# Patient Record
Sex: FEMALE | Race: WHITE | NOT HISPANIC OR LATINO | Employment: STUDENT
[De-identification: names, ages, dates, MRNs, and addresses within clinical notes are randomized per-mention and may not be internally consistent; named-entity substitution may affect disease eponyms.]

---
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
## 2024-01-22 ENCOUNTER — MYC REFILL (OUTPATIENT)
Dept: FAMILY MEDICINE | Facility: CLINIC | Age: 19
End: 2024-01-22
Payer: COMMERCIAL

## 2024-01-22 DIAGNOSIS — N92.6 IRREGULAR MENSES: ICD-10-CM

## 2024-01-22 DIAGNOSIS — Z30.011 ENCOUNTER FOR INITIAL PRESCRIPTION OF CONTRACEPTIVE PILLS: ICD-10-CM

## 2024-01-22 RX ORDER — DESOGESTREL AND ETHINYL ESTRADIOL 0.15-0.03
1 KIT ORAL DAILY
Qty: 84 TABLET | Refills: 0 | Status: SHIPPED | OUTPATIENT
Start: 2024-01-22 | End: 2024-04-21

## 2024-04-21 ENCOUNTER — MYC REFILL (OUTPATIENT)
Dept: FAMILY MEDICINE | Facility: CLINIC | Age: 19
End: 2024-04-21
Payer: COMMERCIAL

## 2024-04-21 DIAGNOSIS — Z30.011 ENCOUNTER FOR INITIAL PRESCRIPTION OF CONTRACEPTIVE PILLS: ICD-10-CM

## 2024-04-21 DIAGNOSIS — N92.6 IRREGULAR MENSES: ICD-10-CM

## 2024-04-22 RX ORDER — DESOGESTREL AND ETHINYL ESTRADIOL 0.15-0.03
1 KIT ORAL DAILY
Qty: 84 TABLET | Refills: 0 | Status: SHIPPED | OUTPATIENT
Start: 2024-04-22 | End: 2024-05-03

## 2024-05-02 NOTE — PROGRESS NOTES
Assessment/Plan:   Olga Lidia is a 19 year old female here for physical     Routine adult health maintenance  Physical completed today.  Up-to-date with immunizations.  No bloodwork indicated today.  Will start Pap smears at age 20.    Irregular menses  Encounter for initial prescription of contraceptive pills  On OCPs for contraception and regulation of menstrual cycle, doing well, no concerns or side effects, refill provided today.  - desogestrel-ethinyl estradiol (APRI) 0.15-30 MG-MCG tablet  Dispense: 84 tablet; Refill: 3       I have had an Advance Directives discussion with the patient.    Follow up: 1 year for physical, sooner as needed    Shelia Matamoros MD  [facility]      Subjective:     Olga Lidia Hernandez is a 19 year old female who presents for an annual exam.     Question 5/3/2024 12:09 PM CDT - Filed by Patient   In general, how would you rate your overall physical health? Excellent   Do you get at least 3 servings of foods that have calcium each day (dairy, green leafy vegetables, etc)? Yes   Do you have a special diet? Regular (no restrictions)   How many servings of fruits and vegetables do you eat daily? (!) 2-3   On average, how many sweetened beverages do you drink each day (Examples: soda, juice, sweet tea, etc.)? Do NOT count diet or artificially sweetened beverages. (!) 2   On average, how many days per week do you engage in moderate to strenuous exercise (like a brisk walk)? 2 days   On average, how many minutes do you engage in exercise at this level? 60 min   How often do you get together with friends or relatives? More than three times a week   Do you see a dentist two times every year? Yes   Do you feel stress - tense, restless, nervous, or anxious, or unable to sleep at night because your mind is troubled all the time - these days? Not at all   If you drink alcohol, do you typically have more than 3 drinks per day OR more than 7 drinks per week? No   Do you use any substances not  prescribed by a provider, out of habit or for other reasons? No   Have you ever been tested for HIV (at a blood donation center, clinic, or during routine prenatal care)? No   Have you had any new sexual partners since you were last tested for sexually transmitted infections, including HIV? No   Do you have any questions about contraception (birth control) or family planning? No   Within the past 12 months, did you worry that your food would run out before you got money to buy more? No   Within the past 12 months, did the food you bought just not last and you didn t have money to get more? No   Do you have housing? Yes   Are you worried about losing your housing? No   Within the past 12 months, has lack of transportation kept you from medical appointments, getting your medicines, non-medical meetings or appointments, work, or from getting things that you need? No   Within the past 12 months, have you or your family members you live with been unable to get utilities (heat, electricity) when it was really needed? No   Were you born outside of the [place]? No     Question 5/3/2024 12:09 PM CDT - Filed by Patient   Q1: Little interest or pleasure in doing things Not at all   Q2: Feeling down, depressed or hopeless Not at all   PHQ-2 Score (range: 0 - 6) 0     Nursing program at [facility]    Seeing dermatology    Health Maintenance reviewed:  Lipid Profile: no  Glucose Screen: no  Colonoscopy: no  Mammogram: no    Gynecologic History  Patient's last menstrual period was 04/29/2024.  Contraception: oral contraceptive  Will do pap at age 20    Immunization History   Administered Date(s) Administered    COVID-19 Bivalent 18+ (Moderna) 02/08/2023    COVID-19 MONOVALENT 12+ (Pfizer) 05/03/2021, 07/30/2021    COVID-19 Monovalent 12+ (Pfizer 2022) 08/01/2022    DTAP (<7y) 2004, 2004, 07/27/2005, 07/09/2009    DTaP/HepB/IPV 2004    Flu, Unspecified 2004    HIB(PRP-OMP)(PedvaxHIB) 2004, 2004,  07/27/2005    HPV9 09/23/2015, 11/05/2015    HepA, Unspecified 11/06/2007, 11/11/2008    HepB, Unspecified 2004, 2004    Influenza (H1N1) 11/10/2009    Influenza (IIV3) PF 11/06/2007, 11/11/2008, 11/07/2009    Influenza Vaccine >6 months,quad, PF 11/11/2013, 11/05/2015, 10/24/2020, 09/22/2022, 10/04/2023    Influenza Vaccine, 6+MO IM (QUADRIVALENT W/PRESERVATIVES) 11/01/2010, 10/17/2011, 10/13/2012, 11/05/2014, 10/27/2017, 03/29/2019    MMR 04/27/2005, 07/09/2009    Meningococcal ACWY (Menactra ) 09/23/2015    Pneumococcal (PCV 7) 2004, 2004, 2004, 07/27/2005    Poliovirus, inactivated (IPV) 2004, 2004, 2004, 11/11/2008    TDAP (Adacel,Boostrix) 09/23/2015    Varicella 04/27/2005, 07/09/2009     Immunization status: up to date and documented.    Current Outpatient Medications   Medication Sig Dispense Refill    clindamycin (CLINDAMAX) 1 % external gel APPLY TOPICALLY TO FACE ONCE DAILY      desogestrel-ethinyl estradiol (APRI) 0.15-30 MG-MCG tablet Take 1 tablet by mouth daily 84 tablet 3    omeprazole (PRILOSEC) 20 MG capsule [OMEPRAZOLE (PRILOSEC) 20 MG CAPSULE]       spironolactone (ALDACTONE) 50 MG tablet Take  mg by mouth daily      tretinoin (RETIN-A) 0.025 % external cream APPLY TOPICALLY TO THE AFFECTED AREA(S) AT BEDTIME AS DIRECTED       Past Medical History:   Diagnosis Date    Candidiasis of vulva and vagina     Created by Conversion     Esophageal reflux     Created by Conversion     Sprain of ankle     Created by Conversion  Replacement Utility updated for latest IMO load     History reviewed. No pertinent surgical history.  Amoxicillin  Family History   Problem Relation Age of Onset    No Known Problems Mother     No Known Problems Father     No Known Problems Sister     No Known Problems Brother     Cancer Maternal Grandmother     Diabetes Paternal Grandmother     Cancer Paternal Grandmother      Social History     Socioeconomic History    Marital  status: Single     Spouse name: Not on file    Number of children: Not on file    Years of education: Not on file    Highest education level: Not on file   Occupational History    Not on file   Tobacco Use    Smoking status: Never     Passive exposure: Never    Smokeless tobacco: Never    Tobacco comments:     no second smoke   Substance and Sexual Activity    Alcohol use: Never    Drug use: Not on file    Sexual activity: Never   Other Topics Concern    Not on file   Social History Narrative    Not on file     Social Determinants of Health     Financial Resource Strain: Low Risk  (5/3/2024)    Financial Resource Strain     Within the past 12 months, have you or your family members you live with been unable to get utilities (heat, electricity) when it was really needed?: No   Food Insecurity: Low Risk  (5/3/2024)    Food Insecurity     Within the past 12 months, did you worry that your food would run out before you got money to buy more?: No     Within the past 12 months, did the food you bought just not last and you didn t have money to get more?: No   Transportation Needs: Low Risk  (5/3/2024)    Transportation Needs     Within the past 12 months, has lack of transportation kept you from medical appointments, getting your medicines, non-medical meetings or appointments, work, or from getting things that you need?: No   Physical Activity: Insufficiently Active (5/3/2024)    Exercise Vital Sign     Days of Exercise per Week: 2 days     Minutes of Exercise per Session: 60 min   Stress: No Stress Concern Present (5/3/2024)    [employer] - Occupational Stress Questionnaire     Feeling of Stress : Not at all   Social Connections: Unknown (5/3/2024)    Social Connection and Isolation Panel [NHANES]     Frequency of Communication with Friends and Family: Not on file     Frequency of Social Gatherings with Friends and Family: More than three times a week     Attends Jain Services: Not on  "file     Active Member of Clubs or Organizations: Not on file     Attends Club or Organization Meetings: Not on file     Marital Status: Not on file   Interpersonal Safety: Low Risk  (5/3/2024)    Interpersonal Safety     Do you feel physically and emotionally safe where you currently live?: Yes     Within the past 12 months, have you been hit, slapped, kicked or otherwise physically hurt by someone?: No     Within the past 12 months, have you been humiliated or emotionally abused in other ways by your partner or ex-partner?: No   Housing Stability: Low Risk  (5/3/2024)    Housing Stability     Do you have housing? : Yes     Are you worried about losing your housing?: No       Review of Systems negative unless noted    Objective:        Vitals:    05/03/24 1347   BP: 114/62   Pulse: 99   Resp: 14   Temp: 98.3  F (36.8  C)   TempSrc: Temporal   SpO2: 99%   Weight: 50.2 kg (110 lb 9.6 oz)   Height: 1.69 m (5' 6.54\")   PainSc: No Pain (0)     Body mass index is 17.57 kg/m .    Physical Exam:  General Appearance: Alert, pleasant, appears stated age  Head: Normocephalic, without obvious abnormality  Eyes: PERRL, conjunctiva/corneas clear, EOM's intact  Ears: Normal TM's and external ear canals, both ears  Nose: Nares normal, septum midline,mucosa normal, no drainage  Throat: Lips, mucosa, and tongue normal; teeth and gums normal; oropharynx is clear  Neck: Supple,without lymphadenopathy, no thyromegaly or nodules noted  Lungs: Clear to auscultation bilaterally, respirations unlabored, no wheezing or crackles  Heart: Regular rate and rhythm, no murmur   Abdomen: Soft, non-tender, no masses, no organomegaly  Extremities: Extremities with strong and symmetric pulses, no cyanosis or edema  Skin: Skin color, texture normal, no rashes or lesions  Neurologic: Grossly normal, no focal deficits    "

## 2024-05-03 ENCOUNTER — OFFICE VISIT (OUTPATIENT)
Dept: FAMILY MEDICINE | Facility: CLINIC | Age: 19
End: 2024-05-03
Payer: COMMERCIAL

## 2024-05-03 VITALS
SYSTOLIC BLOOD PRESSURE: 114 MMHG | OXYGEN SATURATION: 99 % | TEMPERATURE: 98.3 F | HEIGHT: 67 IN | WEIGHT: 110.6 LBS | DIASTOLIC BLOOD PRESSURE: 62 MMHG | HEART RATE: 99 BPM | BODY MASS INDEX: 17.36 KG/M2 | RESPIRATION RATE: 14 BRPM

## 2024-05-03 DIAGNOSIS — Z00.00 ROUTINE ADULT HEALTH MAINTENANCE: Primary | ICD-10-CM

## 2024-05-03 DIAGNOSIS — Z30.011 ENCOUNTER FOR INITIAL PRESCRIPTION OF CONTRACEPTIVE PILLS: ICD-10-CM

## 2024-05-03 DIAGNOSIS — N92.6 IRREGULAR MENSES: ICD-10-CM

## 2024-05-03 PROCEDURE — 99395 PREV VISIT EST AGE 18-39: CPT | Performed by: FAMILY MEDICINE

## 2024-05-03 RX ORDER — DESOGESTREL AND ETHINYL ESTRADIOL 0.15-0.03
1 KIT ORAL DAILY
Qty: 84 TABLET | Refills: 3 | Status: SHIPPED | OUTPATIENT
Start: 2024-05-03

## 2024-05-03 RX ORDER — SPIRONOLACTONE 50 MG/1
50-100 TABLET, FILM COATED ORAL DAILY
COMMUNITY
Start: 2023-08-08

## 2024-05-03 SDOH — HEALTH STABILITY: PHYSICAL HEALTH: ON AVERAGE, HOW MANY DAYS PER WEEK DO YOU ENGAGE IN MODERATE TO STRENUOUS EXERCISE (LIKE A BRISK WALK)?: 2 DAYS

## 2024-05-03 SDOH — HEALTH STABILITY: PHYSICAL HEALTH: ON AVERAGE, HOW MANY MINUTES DO YOU ENGAGE IN EXERCISE AT THIS LEVEL?: 60 MIN

## 2024-05-03 ASSESSMENT — SOCIAL DETERMINANTS OF HEALTH (SDOH): HOW OFTEN DO YOU GET TOGETHER WITH FRIENDS OR RELATIVES?: MORE THAN THREE TIMES A WEEK

## 2024-05-03 ASSESSMENT — PAIN SCALES - GENERAL: PAINLEVEL: NO PAIN (0)

## 2024-05-03 NOTE — PATIENT INSTRUCTIONS
From our records it looks like you need on more HPV vaccine - check to see if you had a 3rd one already otherwise we can do this at a nurse visit

## 2024-06-20 NOTE — PATIENT INSTRUCTIONS
ANATOMY   Your rapid strep test is negative.  Your strep culture should come back tomorrow and we will contact you if this does come back positive and you need antibiotics.  COVID-19 testing should come back tomorrow.    I suspect this is likely a viral upper respiratory illness.  I would recommend Tylenol or ibuprofen, as well as plenty of rest and fluids.  Please be sure to follow-up if your symptoms are getting worse or not improving over the next 3 to 4 days.

## 2025-05-09 ENCOUNTER — RESULTS FOLLOW-UP (OUTPATIENT)
Dept: FAMILY MEDICINE | Facility: CLINIC | Age: 20
End: 2025-05-09

## 2025-06-24 ENCOUNTER — OFFICE VISIT (OUTPATIENT)
Dept: URGENT CARE | Facility: URGENT CARE | Age: 20
End: 2025-06-24
Payer: COMMERCIAL

## 2025-06-24 VITALS
OXYGEN SATURATION: 100 % | WEIGHT: 116 LBS | RESPIRATION RATE: 16 BRPM | SYSTOLIC BLOOD PRESSURE: 129 MMHG | TEMPERATURE: 98.2 F | DIASTOLIC BLOOD PRESSURE: 82 MMHG | BODY MASS INDEX: 18.72 KG/M2 | HEART RATE: 110 BPM

## 2025-06-24 DIAGNOSIS — J01.00 ACUTE NON-RECURRENT MAXILLARY SINUSITIS: Primary | ICD-10-CM

## 2025-06-24 DIAGNOSIS — J02.9 ACUTE PHARYNGITIS, UNSPECIFIED ETIOLOGY: ICD-10-CM

## 2025-06-24 DIAGNOSIS — R07.0 THROAT PAIN: ICD-10-CM

## 2025-06-24 LAB
DEPRECATED S PYO AG THROAT QL EIA: NEGATIVE
S PYO DNA THROAT QL NAA+PROBE: NOT DETECTED

## 2025-06-24 PROCEDURE — 3079F DIAST BP 80-89 MM HG: CPT | Performed by: PHYSICIAN ASSISTANT

## 2025-06-24 PROCEDURE — 87651 STREP A DNA AMP PROBE: CPT | Performed by: PHYSICIAN ASSISTANT

## 2025-06-24 PROCEDURE — 3074F SYST BP LT 130 MM HG: CPT | Performed by: PHYSICIAN ASSISTANT

## 2025-06-24 PROCEDURE — 99213 OFFICE O/P EST LOW 20 MIN: CPT | Performed by: PHYSICIAN ASSISTANT

## 2025-06-24 RX ORDER — AZITHROMYCIN 500 MG/1
500 TABLET, FILM COATED ORAL DAILY
Qty: 5 TABLET | Refills: 0 | Status: SHIPPED | OUTPATIENT
Start: 2025-06-24 | End: 2025-06-29

## 2025-06-24 NOTE — PATIENT INSTRUCTIONS
Suggested increased rest increased fluids and bedside humidification  Over-the-counter Tylenol for comfort.  Follow packaging directions  Over-the-counter throat lozenges with benzocaine, such as Cepacol, may be used if indicated and is not a choking hazard based on age.    Follow packaging directions for throat lozenges.    Do not overuse the benzocaine as it will dry the throat and make it uncomfortable.  Use one lozenge per hour as directed on package and may use hard candies lemon drops etc. keep the saliva flowing until the next dosing interval after 1 hour.    Follow up with primary care provider if you do not get resolution with the course of treatment.  Return to walk-in care if complication or new symptoms arise in the interim.     Over-the-counter nasal steroid spray, follow packaging directions  Over-the-counter Mucinex, follow packaging directions  Hot packs 3 times per day to the forehead and face over the tender sinuses  Nasal saline irrigation or Bethany Hicks for congestion  Antibiotic as written below.  Risks and benefits of the medication were gone over.  Indication for return to see urgent care or family practice provider for reevaluation and treatment.

## 2025-06-24 NOTE — PROGRESS NOTES
[facility] Visit    Chief Complaint   Patient presents with    Sinus Problem      Fever, Sinus, Sore Throat since yesterday

## 2025-06-24 NOTE — PROGRESS NOTES
Patient presents with:  Sinus Problem:  Fever, Sinus, Sore Throat since yesterday       Clinical Decision Making:  Strep test was obtained and was negative.  Culture is to follow.  Symptomatic care was gone over. Expected course of resolution and indication for return was gone over and questions were answered to patient/parent's satisfaction before discharge.        ICD-10-CM    1. Acute non-recurrent maxillary sinusitis  J01.00 azithromycin (ZITHROMAX) 500 MG tablet      2. Acute pharyngitis, unspecified etiology  J02.9 azithromycin (ZITHROMAX) 500 MG tablet      3. Throat pain  R07.0 Streptococcus A Rapid Screen w/Reflex to PCR - Clinic Collect     Group A Streptococcus PCR Throat Swab          Patient Instructions   Suggested increased rest increased fluids and bedside humidification  Over-the-counter Tylenol for comfort.  Follow packaging directions  Over-the-counter throat lozenges with benzocaine, such as Cepacol, may be used if indicated and is not a choking hazard based on age.    Follow packaging directions for throat lozenges.    Do not overuse the benzocaine as it will dry the throat and make it uncomfortable.  Use one lozenge per hour as directed on package and may use hard candies lemon drops etc. keep the saliva flowing until the next dosing interval after 1 hour.    Follow up with primary care provider if you do not get resolution with the course of treatment.  Return to walk-in care if complication or new symptoms arise in the interim.     Over-the-counter nasal steroid spray, follow packaging directions  Over-the-counter Mucinex, follow packaging directions  Hot packs 3 times per day to the forehead and face over the tender sinuses  Nasal saline irrigation or [place] Hicks for congestion  Antibiotic as written below.  Risks and benefits of the medication were gone over.  Indication for return to see urgent care or family practice provider for reevaluation and treatment.      HPI:  Olga Lidia Hernandez is a  20 year old female who presents today for 1 day history of sore throat odynophagia sinus pain pressure headache that is made worse with dependency and postnasal drainage.  Has had a scant cough.  No reported fever chills night sweats vomiting diarrhea skin rash abdominal pain or urinary symptoms.  Treatment at home is consisted of Mucinex.  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    History obtained from chart review and the patient.    Problem List:  There are no relevant problems documented for this patient.      Past Medical History:   Diagnosis Date    Candidiasis of vulva and vagina     Created by Conversion     Esophageal reflux     Created by Conversion     Sprain of ankle     Created by Conversion  Replacement Utility updated for latest IMO load       Social History     Tobacco Use    Smoking status: Never     Passive exposure: Never    Smokeless tobacco: Never    Tobacco comments:     no second smoke   Substance Use Topics    Alcohol use: Never       Review of Systems  As above in HPI otherwise negative.    Vitals:    06/24/25 1311   BP: 129/82   Pulse: 110   Resp: 16   Temp: 98.2  F (36.8  C)   TempSrc: Oral   SpO2: 100%   Weight: 52.6 kg (116 lb)       General: Patient is resting comfortably no acute distress is afebrile  HEENT: Head is normocephalic atraumatic   Frontal and maxillary sinuses are tender to percussion.  eyes are PERRL EOMI sclera anicteric   TMs are clear bilaterally  Throat is with posterior pharyngeal wall drainage   No cervical lymphadenopathy present  LUNGS: Clear to auscultation bilaterally  HEART: Regular rate and rhythm  Skin: Without rash non-diaphoretic    Physical Exam      Labs:  Results for orders placed or performed in visit on 06/24/25   Streptococcus A Rapid Screen w/Reflex to PCR - Clinic Collect     Status: Normal    Specimen: Throat; Swab   Result Value Ref Range    Group A Strep antigen Negative Negative     At the end of  the encounter, I discussed results, diagnosis, medications. Discussed red flags for immediate return to clinic/ER, as well as indications for follow up if no improvement. Patient understood and agreed to plan. Patient was stable for discharge.